# Patient Record
Sex: MALE | Race: WHITE | NOT HISPANIC OR LATINO
[De-identification: names, ages, dates, MRNs, and addresses within clinical notes are randomized per-mention and may not be internally consistent; named-entity substitution may affect disease eponyms.]

---

## 2023-04-26 ENCOUNTER — NON-APPOINTMENT (OUTPATIENT)
Age: 73
End: 2023-04-26

## 2024-03-29 ENCOUNTER — APPOINTMENT (OUTPATIENT)
Dept: ORTHOPEDIC SURGERY | Facility: CLINIC | Age: 74
End: 2024-03-29
Payer: MEDICARE

## 2024-03-29 ENCOUNTER — RESULT CHARGE (OUTPATIENT)
Age: 74
End: 2024-03-29

## 2024-03-29 VITALS — HEIGHT: 69 IN | WEIGHT: 195 LBS | BODY MASS INDEX: 28.88 KG/M2

## 2024-03-29 PROCEDURE — 99203 OFFICE O/P NEW LOW 30 MIN: CPT | Mod: 25

## 2024-03-29 PROCEDURE — 73080 X-RAY EXAM OF ELBOW: CPT | Mod: RT

## 2024-03-29 PROCEDURE — 20605 DRAIN/INJ JOINT/BURSA W/O US: CPT | Mod: RT

## 2024-03-29 NOTE — IMAGING
[de-identified] : No warmth, no ecchymosis Bursal collection noted No Tenderness to palpation over elbow Full elbow flexion, extension, supination, pronation 5/5 strength in flexion, extension, supination, pronation No Varus or Valgus instability Motor and sensory intact distally [Right] : right elbow [There are no fractures, subluxations or dislocations. No significant abnormalities are seen] : There are no fractures, subluxations or dislocations. No significant abnormalities are seen

## 2024-03-29 NOTE — PROCEDURE
[FreeTextEntry3] : Ss - The indication for this procedure was pain and inflammation. Patient has tried OTC's including aspirin, Ibuprofen, Aleve, etc or prescription NSAIDS, and/or exercises at home and/or physical therapy without satisfactory response, patient had decreased mobility in the joint and the risks benefits, and alternatives have been discussed, and verbal consent was obtained. The site was prepped with alcohol, betadine, ethyl chloride sprayed topically and sterile technique used.

## 2024-03-29 NOTE — HISTORY OF PRESENT ILLNESS
[Localized] : localized [Constant] : constant [] : no [FreeTextEntry1] : right elbow  [FreeTextEntry5] : no injury,  [FreeTextEntry3] : 1 month

## 2024-03-29 NOTE — ASSESSMENT
[FreeTextEntry1] : 10 cc serosanguineous fluid aspirated aspirated from the right elbow.  Atraumatic onset.  No evidence of infection today.  Risk of recurrence reviewed.  Follow-up as needed.  Compressive wrap.

## 2024-05-16 ENCOUNTER — APPOINTMENT (OUTPATIENT)
Dept: ORTHOPEDIC SURGERY | Facility: CLINIC | Age: 74
End: 2024-05-16
Payer: MEDICARE

## 2024-05-16 VITALS — HEIGHT: 69 IN | WEIGHT: 195 LBS | BODY MASS INDEX: 28.88 KG/M2

## 2024-05-16 DIAGNOSIS — M70.21 OLECRANON BURSITIS, RIGHT ELBOW: ICD-10-CM

## 2024-05-16 PROCEDURE — 99213 OFFICE O/P EST LOW 20 MIN: CPT

## 2024-05-16 NOTE — ASSESSMENT
[FreeTextEntry1] : Right elbow with minimal bursal collection today, no signs of infection. Discussed risk of aspiration and patient defers today. Discussed use of compression sleeve as needed.  Briefly discussed arthroscopic surgery but patient is not interested at this time. Advised to return to clinic or nearest ER if any redness, fever, chills occur. Return as needed.

## 2024-05-16 NOTE — IMAGING
[de-identified] : Elbow Exam: Inspection: No warmth, no ecchymosis, minimal bursal collection noted Palpation: No Tenderness to palpation over elbow Range of motion: Full elbow flexion, extension, supination, pronation Strength: 5/5 flexion, extension, supination, pronation Stability: No Varus or Valgus instability Motor and sensory intact distally

## 2024-05-16 NOTE — HISTORY OF PRESENT ILLNESS
[Localized] : localized [Constant] : constant [de-identified] : 05/16/24: Follow up for right elbow. Aspiration done on 3/29/24 helped. Pt states swelling returned about 1 month ago. [] : no [FreeTextEntry1] : right elbow  [FreeTextEntry3] : 1 month  [FreeTextEntry5] : no injury,

## 2025-07-23 ENCOUNTER — APPOINTMENT (OUTPATIENT)
Dept: ORTHOPEDIC SURGERY | Facility: CLINIC | Age: 75
End: 2025-07-23
Payer: MEDICARE

## 2025-07-23 VITALS — BODY MASS INDEX: 28.14 KG/M2 | HEIGHT: 69 IN | WEIGHT: 190 LBS

## 2025-07-23 DIAGNOSIS — Z87.19 PERSONAL HISTORY OF OTHER DISEASES OF THE DIGESTIVE SYSTEM: ICD-10-CM

## 2025-07-23 DIAGNOSIS — Z86.59 PERSONAL HISTORY OF OTHER MENTAL AND BEHAVIORAL DISORDERS: ICD-10-CM

## 2025-07-23 DIAGNOSIS — E78.00 PURE HYPERCHOLESTEROLEMIA, UNSPECIFIED: ICD-10-CM

## 2025-07-23 DIAGNOSIS — Z78.9 OTHER SPECIFIED HEALTH STATUS: ICD-10-CM

## 2025-07-23 DIAGNOSIS — M75.01 ADHESIVE CAPSULITIS OF RIGHT SHOULDER: ICD-10-CM

## 2025-07-23 PROCEDURE — 99213 OFFICE O/P EST LOW 20 MIN: CPT | Mod: 25

## 2025-07-23 PROCEDURE — 73030 X-RAY EXAM OF SHOULDER: CPT | Mod: RT

## 2025-07-23 PROCEDURE — 72040 X-RAY EXAM NECK SPINE 2-3 VW: CPT

## 2025-07-23 PROCEDURE — 20610 DRAIN/INJ JOINT/BURSA W/O US: CPT | Mod: RT

## 2025-07-23 RX ORDER — MELOXICAM 15 MG/1
15 TABLET ORAL
Qty: 30 | Refills: 0 | Status: ACTIVE | COMMUNITY
Start: 2025-07-23 | End: 2025-08-22